# Patient Record
Sex: FEMALE | Race: WHITE
[De-identification: names, ages, dates, MRNs, and addresses within clinical notes are randomized per-mention and may not be internally consistent; named-entity substitution may affect disease eponyms.]

---

## 2019-10-07 ENCOUNTER — HOSPITAL ENCOUNTER (EMERGENCY)
Dept: HOSPITAL 47 - EC | Age: 64
Discharge: HOME | End: 2019-10-07
Payer: MEDICARE

## 2019-10-07 VITALS — SYSTOLIC BLOOD PRESSURE: 130 MMHG | HEART RATE: 70 BPM | DIASTOLIC BLOOD PRESSURE: 75 MMHG | TEMPERATURE: 98 F

## 2019-10-07 VITALS — RESPIRATION RATE: 18 BRPM

## 2019-10-07 DIAGNOSIS — E11.9: ICD-10-CM

## 2019-10-07 DIAGNOSIS — Z48.00: Primary | ICD-10-CM

## 2019-10-07 DIAGNOSIS — Z96.653: ICD-10-CM

## 2019-10-07 DIAGNOSIS — F32.9: ICD-10-CM

## 2019-10-07 DIAGNOSIS — I10: ICD-10-CM

## 2019-10-07 DIAGNOSIS — Z79.899: ICD-10-CM

## 2019-10-07 DIAGNOSIS — S30.811A: ICD-10-CM

## 2019-10-07 DIAGNOSIS — Z79.84: ICD-10-CM

## 2019-10-07 DIAGNOSIS — F41.9: ICD-10-CM

## 2019-10-07 PROCEDURE — 99283 EMERGENCY DEPT VISIT LOW MDM: CPT

## 2019-10-07 NOTE — ED
Skin/Abscess/FB HPI





- General


Chief complaint: Skin/Abscess/Foreign Body


Stated complaint: Wound drainage


Time Seen by Provider: 10/07/19 19:46


Source: patient, RN notes reviewed, old records reviewed


Mode of arrival: ambulatory


Limitations: no limitations





- History of Present Illness


Initial comments: 





This patient's a 64-year-old female presents she presents emergency department 

today for some irritation over a wound of her right groin.  Patient reports that

she had multiple lymph nodes removed in her abdomen, and also had applications 

with having bleeding capillaries over this wound as back in January.  She 

reports that she had a vascular surgeon cauterize this and had a wound VAC.  She

states that she's been healing well with her wound since January.  Patient 

states that she took from around today noticed some redness and minor drainage 

on the site today.  She denies a significant pain.  Patient reports that she was

quite active.





- Related Data


                                Home Medications











 Medication  Instructions  Recorded  Confirmed


 


DULoxetine HCL 80 mg PO DAILY 10/07/19 10/07/19


 


Gabapentin [Neurontin] 300 mg PO TID 10/07/19 10/07/19


 


Lisinopril 20 mg PO DAILY 10/07/19 10/07/19


 


Meclizine [Antivert] 25 mg PO TID PRN 10/07/19 10/07/19


 


Oxybutynin Chloride [Ditropan XL] 5 mg PO HS 10/07/19 10/07/19


 


Prochlorperazine [Compazine] 10 mg PO Q6H PRN 10/07/19 10/07/19


 


metFORMIN HCL [Glucophage] 500 mg PO DAILY 10/07/19 10/07/19








                                  Previous Rx's











 Medication  Instructions  Recorded


 


Mupirocin 2% Oint [Bactroban 2% 1 applic TOPICAL TID #60 gm 10/07/19





Oint]  











                                    Allergies











Allergy/AdvReac Type Severity Reaction Status Date / Time


 


No Known Allergies Allergy   Verified 10/07/19 20:35














Review of Systems


ROS Statement: 


Those systems with pertinent positive or pertinent negative responses have been 

documented in the HPI.





ROS Other: All systems not noted in ROS Statement are negative.





Past Medical History


Past Medical History: Cancer, Diabetes Mellitus, Hypertension


History of Any Multi-Drug Resistant Organisms: None Reported


Past Surgical History: Hysterectomy, Joint Replacement


Additional Past Surgical History / Comment(s): lumph node and buttock tumor 

removed,vascular repair, arcelia knee replacement


Past Psychological History: Anxiety, Depression


Smoking Status: Never smoker


Past Alcohol Use History: None Reported


Past Drug Use History: None Reported





General Exam





- General Exam Comments


Initial Comments: 





This is a 64-year-old female.  Alert and oriented.  No distress.


Limitations: no limitations


General appearance: alert, in no apparent distress


Head exam: Present: atraumatic, normocephalic, normal inspection


Eye exam: Present: normal appearance, PERRL, EOMI.  Absent: scleral icterus, 

conjunctival injection, periorbital swelling


ENT exam: Present: normal exam, mucous membranes moist


Neck exam: Present: normal inspection.  Absent: tenderness, meningismus, 

lymphadenopathy


Respiratory exam: Present: normal lung sounds bilaterally.  Absent: respiratory 

distress, wheezes, rales, rhonchi, stridor


Cardiovascular Exam: Present: regular rate, normal rhythm, normal heart sounds. 

Absent: systolic murmur, diastolic murmur, rubs, gallop, clicks


GI/Abdominal exam: Present: soft, normal bowel sounds, other (Patient has a 

well-appearing wound over the right groin multiple scars.  There is evidence of 

some irritation, abrasion over the wound with some erythema.  No sialitis.  No 

fluid collection.  Bleeding stopped.  There is some minor blood spots on her 

underwear.).  Absent: distended, tenderness, guarding, rebound, rigid


Extremities exam: Present: normal inspection, full ROM, normal capillary refill.

 Absent: tenderness, pedal edema, joint swelling, calf tenderness


Back exam: Present: normal inspection


Neurological exam: Present: alert, oriented X3, CN II-XII intact


Psychiatric exam: Present: normal affect, normal mood


Skin exam: Present: warm, dry, intact, normal color.  Absent: rash





Course


                                   Vital Signs











  10/07/19





  19:36


 


Temperature 98.8 F


 


Pulse Rate 75


 


Respiratory 18





Rate 


 


Blood Pressure 138/80


 


O2 Sat by Pulse 98





Oximetry 














Medical Decision Making





- Medical Decision Making





This is a 64-year-old female.  She presents today for concerns for irritation 

over her wound in her right groin.  She had surgery and, patient's bleeding back

in generally.  At this time she has some minor erythema, likely rubbed in a 

brace the skin over the scar.  Patient reports she tries keep area dry.  This is

within her abdominal and groin fold.  It does appear that skin just irritated 

and agrees.  No abscess or infection at this time.  Discussed putting a thin 

film of antibiotic ointment and keeping the area clean and dry.  She is given 

intra-dry to keep this area clean.  I discussed monitoring for any worsening 

infection she can follow-up with her primary care doctor.  All questions were 

answered.





Disposition


Clinical Impression: 


 Visit for wound check, Abrasion





Disposition: HOME SELF-CARE


Condition: Good


Instructions (If sedation given, give patient instructions):  Abrasion (ED)


Additional Instructions: 


Patient is advised to keep area clean and dry.  Apply thin film of antibiotic 

ointment over the areas well.  Monitor for is any further significant bleeding 

please return for recheck.


Prescriptions: 


Mupirocin 2% Oint [Bactroban 2% Oint] 1 applic TOPICAL TID #60 gm


Is patient prescribed a controlled substance at d/c from ED?: No


Referrals: 


Charlie Schulz MD [Primary Care Provider] - 1-2 days


Time of Disposition: 20:32

## 2021-04-09 ENCOUNTER — HOSPITAL ENCOUNTER (OUTPATIENT)
Dept: HOSPITAL 47 - LABWHC1 | Age: 66
Discharge: HOME | End: 2021-04-09
Attending: SURGERY
Payer: MEDICARE

## 2021-04-09 DIAGNOSIS — Z20.822: Primary | ICD-10-CM

## 2021-04-16 ENCOUNTER — HOSPITAL ENCOUNTER (OUTPATIENT)
Dept: HOSPITAL 47 - ORWHC2ENDO | Age: 66
Discharge: HOME | End: 2021-04-16
Attending: SURGERY
Payer: MEDICARE

## 2021-04-16 VITALS — SYSTOLIC BLOOD PRESSURE: 152 MMHG | DIASTOLIC BLOOD PRESSURE: 80 MMHG | HEART RATE: 78 BPM

## 2021-04-16 VITALS — TEMPERATURE: 97 F

## 2021-04-16 VITALS — BODY MASS INDEX: 29.4 KG/M2

## 2021-04-16 VITALS — RESPIRATION RATE: 18 BRPM

## 2021-04-16 DIAGNOSIS — Z85.828: ICD-10-CM

## 2021-04-16 DIAGNOSIS — F41.9: ICD-10-CM

## 2021-04-16 DIAGNOSIS — R13.10: ICD-10-CM

## 2021-04-16 DIAGNOSIS — I10: ICD-10-CM

## 2021-04-16 DIAGNOSIS — K29.50: ICD-10-CM

## 2021-04-16 DIAGNOSIS — Z79.899: ICD-10-CM

## 2021-04-16 DIAGNOSIS — E11.9: ICD-10-CM

## 2021-04-16 DIAGNOSIS — K25.9: ICD-10-CM

## 2021-04-16 DIAGNOSIS — F32.9: ICD-10-CM

## 2021-04-16 DIAGNOSIS — K26.9: ICD-10-CM

## 2021-04-16 DIAGNOSIS — K21.00: Primary | ICD-10-CM

## 2021-04-16 DIAGNOSIS — Z79.84: ICD-10-CM

## 2021-04-16 LAB — GLUCOSE BLD-MCNC: 140 MG/DL (ref 75–99)

## 2021-04-16 PROCEDURE — 88305 TISSUE EXAM BY PATHOLOGIST: CPT

## 2021-04-16 PROCEDURE — 43239 EGD BIOPSY SINGLE/MULTIPLE: CPT

## 2021-04-16 NOTE — P.OP
Date of Procedure: 04/16/21


Preoperative Diagnosis: 


GERD


Postoperative Diagnosis: 


Duodenal ulcer


Gastric ulcer


Gastritis 


Procedure(s) Performed: 


EGD with biopsy 


Surgeon: Justo Guallpa


Condition: stable


Disposition: same day


Description of Procedure: 


Patient is brought to the Endo suite underwent sedation per department of 

anesthesia timeout performed correct patient correct procedure correct site was 

verified scope was placed through the oropharynx down the esophagus with ease 

and direct visualization through the stomach and into the first and second 

portion of the duodenum there was a large duodenal ulcer noted at the sweep, 

this was 1-1-1/2 cm long.  And did create a small stricture however the scope 

was able to pass past this.  The base of the ulcer was clean with no active 

bleeding.  Biopsy was taken.  Scope was withdrawn to the antrum biopsy of antrum

was taken mild gastritis is noted scope was retroflexed in the body and walls of

the stomach were inspected no significant hilar hernia was noted there was a 

small ulcer noted along the greater curvature biopsy of this was taken.  Scope 

was withdrawn and the GE junction and biopsies were taken to rule out Jeffrey's 

esophagitis scope was then slowly withdrawn through the esophagus no other 

abnormalities are noted patient tolerated procedure well no apparent 

complications